# Patient Record
Sex: MALE | Race: WHITE | NOT HISPANIC OR LATINO | Employment: UNEMPLOYED | URBAN - NONMETROPOLITAN AREA
[De-identification: names, ages, dates, MRNs, and addresses within clinical notes are randomized per-mention and may not be internally consistent; named-entity substitution may affect disease eponyms.]

---

## 2018-11-05 ENCOUNTER — OFFICE VISIT (OUTPATIENT)
Dept: CARDIOLOGY | Facility: CLINIC | Age: 78
End: 2018-11-05

## 2018-11-05 VITALS
DIASTOLIC BLOOD PRESSURE: 60 MMHG | SYSTOLIC BLOOD PRESSURE: 100 MMHG | HEIGHT: 66 IN | HEART RATE: 60 BPM | BODY MASS INDEX: 26.36 KG/M2 | WEIGHT: 164 LBS

## 2018-11-05 DIAGNOSIS — E88.81 METABOLIC SYNDROME: ICD-10-CM

## 2018-11-05 DIAGNOSIS — R07.89 OTHER CHEST PAIN: ICD-10-CM

## 2018-11-05 DIAGNOSIS — I49.5 SSS (SICK SINUS SYNDROME) (HCC): ICD-10-CM

## 2018-11-05 DIAGNOSIS — Z79.4 TYPE 2 DIABETES MELLITUS WITHOUT COMPLICATION, WITH LONG-TERM CURRENT USE OF INSULIN (HCC): ICD-10-CM

## 2018-11-05 DIAGNOSIS — E03.8 OTHER SPECIFIED HYPOTHYROIDISM: ICD-10-CM

## 2018-11-05 DIAGNOSIS — R06.02 SHORTNESS OF BREATH: ICD-10-CM

## 2018-11-05 DIAGNOSIS — R01.1 MURMUR, CARDIAC: ICD-10-CM

## 2018-11-05 DIAGNOSIS — E11.9 TYPE 2 DIABETES MELLITUS WITHOUT COMPLICATION, WITH LONG-TERM CURRENT USE OF INSULIN (HCC): ICD-10-CM

## 2018-11-05 PROCEDURE — 93000 ELECTROCARDIOGRAM COMPLETE: CPT | Performed by: INTERNAL MEDICINE

## 2018-11-05 PROCEDURE — 99204 OFFICE O/P NEW MOD 45 MIN: CPT | Performed by: INTERNAL MEDICINE

## 2018-11-05 RX ORDER — AMIODARONE HYDROCHLORIDE 200 MG/1
200 TABLET ORAL DAILY
COMMUNITY
End: 2018-12-19 | Stop reason: SDUPTHER

## 2018-11-05 RX ORDER — SERTRALINE HYDROCHLORIDE 100 MG/1
100 TABLET, FILM COATED ORAL DAILY
COMMUNITY

## 2018-11-05 RX ORDER — ATORVASTATIN CALCIUM 40 MG/1
40 TABLET, FILM COATED ORAL NIGHTLY
COMMUNITY

## 2018-11-05 RX ORDER — GABAPENTIN 300 MG/1
300 CAPSULE ORAL 3 TIMES DAILY
COMMUNITY

## 2018-11-05 RX ORDER — CARVEDILOL 3.12 MG/1
3.12 TABLET ORAL 2 TIMES DAILY WITH MEALS
COMMUNITY

## 2018-11-05 RX ORDER — PANTOPRAZOLE SODIUM 40 MG/1
40 TABLET, DELAYED RELEASE ORAL DAILY
COMMUNITY

## 2018-11-05 RX ORDER — ASPIRIN 81 MG/1
81 TABLET ORAL DAILY
COMMUNITY

## 2018-11-05 RX ORDER — LISINOPRIL 2.5 MG/1
2.5 TABLET ORAL DAILY
COMMUNITY

## 2018-11-05 RX ORDER — LEVOTHYROXINE SODIUM 0.07 MG/1
75 TABLET ORAL DAILY
COMMUNITY

## 2018-11-05 RX ORDER — FERROUS SULFATE 325(65) MG
325 TABLET ORAL 2 TIMES DAILY
COMMUNITY

## 2018-11-05 RX ORDER — LOPERAMIDE HYDROCHLORIDE 2 MG/1
2 CAPSULE ORAL 2 TIMES DAILY
COMMUNITY

## 2018-11-05 NOTE — PROGRESS NOTES
Chief Complaint   Patient presents with   • Establish Care     establish care to check [employer] PPM.  per implant card, implanted 4/6/2016.  PPM has not been checked since per patient.   • Cardiac testing      8/15/2016 pericardial valve replacement.  Trying to get records from  and [facility].  Pt also reports carotid surgery around the same time at .  Possibly had heart stenting also, getting records.   • Labs     Labs attach to referral.  10/26/18 TSH 54.150.  Pt reports PCP adjusted levothyroxine.  He forgot to bring med list today.  Med list is per PCP referal notes.   • Chest Pain     CP radiates to left arm, last episode about 1-2 weeks ago.   • Shortness of Breath     SOA with exertion.   • Dizziness     dizziness and blurred vision.  No recent carotid u/s per patient        CARDIAC COMPLAINTS  chest pressure/discomfort, dyspnea and Dizziness      Subjective   Ko Florentino is a 78 y.o. male came in today with his son for the initial cardiac evaluation.  He has a very complex cardiac history.  He is not able to tell us much.  We have to pulled records from the hospital at [facility] as well as from .  He apparently had a stroke in 2015 and underwent right carotid endarterectomy.  Then in February 2016 he was noted to have significant aortic stenosis.  I'm not sure, whether he had paroxysmal atrial fibrillation at that time.  He then got admitted in April 2016 with bradycardia and found to have hyperkalemia and renal failure.  He had a temporary pacemaker placed, followed by permanent pacemaker placement.  Apparently he was sent to  few months later and underwent TAVAR.  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  He has not been followed by any cardiologist since then.  He was seen at your office recently and found to have significantly elevated TSH of 54.  Normal hemoglobin.  Elevated A1c of 9.5 and cholesterol of 143 with the LDL of 89.  He also was diagnosed  with hepatitis C and is scheduled to see a gastroenterologist.  Regarding his cardiac symptoms, he has been having chest pain in the form of pressure-like feeling in the middle of the chest radiating down to the left arm.  It started few weeks ago and is occurring more frequently now.  It lasts only for a few seconds and it is not associated with diaphoresis.  He also has been having symptoms of shortness of breath on minimal exertion.  He has been having episodes of dizziness and blurring of vision.  He said, his carotid has not been checked since he had the surgery. He quit smoking in 1964 but uses smokeless tobacco.  He does have family history of diabetes, hypertension and ischemic heart disease.  I he also did not bring any of his medication and medication list I have is from your notes.        Past Surgical History:   Procedure Laterality Date   • AORTIC VALVE REPAIR/REPLACEMENT  08/15/2016    TAVAR @ [facility]   • CARDIOVASCULAR STRESS TEST  04/21/2016    @[facility]. Dr. Benitez. XIMENA. Myoview- EF 62%. Negative.   • CAROTID ENDARTERECTOMY  08/06/2015    (R) Carotid Endarterectomy.. Dr. Purdy.   • ECHO - CONVERTED  02/22/2016    @[facility]. Dr. Magaña- EF 50%. AV G- 36/72. HUNG- 0.6. RVSP- 48 mmHg.   • PACEMAKER IMPLANTATION  04/06/2016    Dr. Magaña- [place].       Current Outpatient Prescriptions   Medication Sig Dispense Refill   • amiodarone (PACERONE) 200 MG tablet Take 200 mg by mouth Daily.     • aspirin 81 MG EC tablet Take 81 mg by mouth Daily.     • atorvastatin (LIPITOR) 40 MG tablet Take 40 mg by mouth Every Night.     • carvedilol (COREG) 3.125 MG tablet Take 3.125 mg by mouth 2 (Two) Times a Day With Meals.     • ferrous sulfate 325 (65 FE) MG tablet Take 325 mg by mouth 2 (Two) Times a Day.     • gabapentin (NEURONTIN) 300 MG capsule Take 300 mg by mouth 3 (Three) Times a Day.     • insulin detemir (LEVEMIR) 100 UNIT/ML injection Inject 30 Units under the skin into the appropriate area as directed 2 (Two)  Times a Day.     • levothyroxine (SYNTHROID, LEVOTHROID) 75 MCG tablet Take 75 mcg by mouth Daily.     • lisinopril (PRINIVIL,ZESTRIL) 2.5 MG tablet Take 2.5 mg by mouth Daily.     • loperamide (IMODIUM) 2 MG capsule Take 2 mg by mouth 2 (Two) Times a Day. As needed     • pantoprazole (PROTONIX) 40 MG EC tablet Take 40 mg by mouth Daily.     • sertraline (ZOLOFT) 100 MG tablet Take 100 mg by mouth Daily.       No current facility-administered medications for this visit.            ALLERGIES:  Patient has no known allergies.    Past Medical History:   Diagnosis Date   • Chronic kidney disease    • CVA (cerebral vascular accident) (CMS/HCC)    • Diabetes mellitus (CMS/HCC)    • Hepatitis C, chronic (CMS/HCC)    • Hypercholesterolemia    • Hypothyroidism        History   Smoking Status   • Former Smoker   • Quit date: 1964   Smokeless Tobacco   • Current User   • Types: Chew          Family History   Problem Relation Age of Onset   • Diabetes Mother    • Stroke Mother    • Emphysema Father    • Heart disease Daughter        Review of Systems   Constitution: Positive for weakness and malaise/fatigue. Negative for decreased appetite.   HENT: Negative for congestion and sore throat.    Eyes: Negative for blurred vision.   Cardiovascular: Positive for chest pain, dyspnea on exertion and palpitations.   Respiratory: Positive for shortness of breath. Negative for snoring.    Endocrine: Negative for cold intolerance and heat intolerance.   Hematologic/Lymphatic: Negative for adenopathy. Does not bruise/bleed easily.   Skin: Negative for itching, nail changes and skin cancer.   Musculoskeletal: Negative for arthritis and myalgias.   Gastrointestinal: Negative for abdominal pain, dysphagia and heartburn.   Genitourinary: Negative for bladder incontinence and frequency.   Neurological: Negative for dizziness, light-headedness, seizures and vertigo.   Psychiatric/Behavioral: Positive for depression. Negative for altered mental  "status.   Allergic/Immunologic: Negative for environmental allergies and hives.       Diabetes- Yes  Thyroid- abnormal    Objective     /60 (BP Location: Left arm)   Pulse 60   Ht 167.6 cm (66\")   Wt 74.4 kg (164 lb)   BMI 26.47 kg/m²     Physical Exam   Constitutional: He is oriented to person, place, and time. He appears well-developed and well-nourished.   HENT:   Head: Normocephalic.   Nose: Nose normal.   Eyes: Pupils are equal, round, and reactive to light. EOM are normal.   Neck: Normal range of motion. Neck supple.   Cardiovascular: Normal rate, regular rhythm, S1 normal and S2 normal.    Murmur heard.  Pulmonary/Chest: Effort normal and breath sounds normal.   Abdominal: Soft. Bowel sounds are normal.   Musculoskeletal: Normal range of motion. He exhibits no edema.   Neurological: He is alert and oriented to person, place, and time.   Skin: Skin is warm and dry.   Psychiatric: He has a normal mood and affect.         ECG 12 Lead  Date/Time: 11/5/2018 12:51 PM  Performed by: HILDA GEIGER  Authorized by: HILDA GEIGER   Previous ECG: no previous ECG available  Rhythm: paced  Rate: normal  QRS axis: left  Other findings: prolonged QTc interval  Clinical impression: abnormal ECG              Assessment/Plan   Patient's Body mass index is 26.47 kg/m². BMI is above normal parameters. Recommendations include: nutrition counseling.     Ko was seen today for establish care, cardiac testing, labs, chest pain, shortness of breath and dizziness.    Diagnoses and all orders for this visit:    Other chest pain  -     Stress Test With Myocardial Perfusion One Day; Future    Shortness of breath  -     Adult Transthoracic Echo Complete W/ Cont if Necessary Per Protocol; Future    Murmur, cardiac    SSS (sick sinus syndrome) (CMS/HCC)    Type 2 diabetes mellitus without complication, with long-term current use of insulin (CMS/HCC)    Other specified hypothyroidism    Metabolic syndrome    Other " orders  -     SCANNED - LABS  -     SCANNED - LABS     At baseline, his heart rate and blood pressure appears stable.  His EKG shows paced rhythm with prolonged QRS and QTC.  His clinical examination reveals systolic murmur at the mitral and aortic area.  I had a long talk with him and his son about his medications.  I'm not sure why he is on amiodarone.  It could be from PAF versus V. tach.  I scheduled his pacemaker to be interrogated.  If it is due to PAF, then he should be on an anticoagulant since his risk of stroke is going to be very high.  If there is no evidence of PAF, then he should go off the amiodarone since it can make the thyroid dysfunction was.  I also scheduled him to undergo an echocardiogram to evaluate his LV function, valvular structures and the PA pressure.  He also need a stress test in the form of Lexiscan to rule out ischemia causing the chest pain.  He sometimes has some questions about the hepatitis C about whether he needs to be checked also.  I advised him to talk you regarding.  He also need an elective carotid ultrasound done for follow-up after his surgery.  Based on the results of the interrogation and the results of the tests, further recommendations will be made.  His BMI is around 27.  I talked to him about cutting down on the carbohydrate intake.               Electronically signed by Woo Bob MD November 5, 2018 12:38 PM

## 2018-11-14 ENCOUNTER — OFFICE VISIT (OUTPATIENT)
Dept: CARDIOLOGY | Facility: CLINIC | Age: 78
End: 2018-11-14

## 2018-11-14 ENCOUNTER — HOSPITAL ENCOUNTER (OUTPATIENT)
Dept: CARDIOLOGY | Facility: HOSPITAL | Age: 78
Discharge: HOME | End: 2018-11-14
Attending: INTERNAL MEDICINE

## 2018-11-14 DIAGNOSIS — R07.89 OTHER CHEST PAIN: ICD-10-CM

## 2018-11-14 DIAGNOSIS — I49.5 SSS (SICK SINUS SYNDROME) (HCC): Primary | ICD-10-CM

## 2018-11-14 DIAGNOSIS — R06.02 SHORTNESS OF BREATH: ICD-10-CM

## 2018-11-14 PROCEDURE — 93288 INTERROG EVL PM/LDLS PM IP: CPT | Performed by: INTERNAL MEDICINE

## 2018-11-14 PROCEDURE — 93306 TTE W/DOPPLER COMPLETE: CPT | Performed by: INTERNAL MEDICINE

## 2018-11-14 PROCEDURE — 93017 CV STRESS TEST TRACING ONLY: CPT

## 2018-11-14 PROCEDURE — 0 TECHNETIUM SESTAMIBI: Performed by: INTERNAL MEDICINE

## 2018-11-14 PROCEDURE — 93018 CV STRESS TEST I&R ONLY: CPT | Performed by: INTERNAL MEDICINE

## 2018-11-14 PROCEDURE — 25010000002 REGADENOSON 0.4 MG/5ML SOLUTION: Performed by: INTERNAL MEDICINE

## 2018-11-14 PROCEDURE — 93306 TTE W/DOPPLER COMPLETE: CPT

## 2018-11-14 PROCEDURE — 78452 HT MUSCLE IMAGE SPECT MULT: CPT

## 2018-11-14 PROCEDURE — 78452 HT MUSCLE IMAGE SPECT MULT: CPT | Performed by: INTERNAL MEDICINE

## 2018-11-14 PROCEDURE — A9500 TC99M SESTAMIBI: HCPCS | Performed by: INTERNAL MEDICINE

## 2018-11-14 RX ADMIN — TECHNETIUM TC 99M SESTAMIBI 1 DOSE: 1 INJECTION INTRAVENOUS at 10:26

## 2018-11-14 RX ADMIN — REGADENOSON 0.4 MG: 0.08 INJECTION, SOLUTION INTRAVENOUS at 10:26

## 2018-11-14 RX ADMIN — TECHNETIUM TC 99M SESTAMIBI 1 DOSE: 1 INJECTION INTRAVENOUS at 10:27

## 2018-11-16 LAB
BH CV ECHO MEAS - ACS: 1.2 CM
BH CV ECHO MEAS - AO MEAN PG (FULL): 5.9 MMHG
BH CV ECHO MEAS - AO MEAN PG: 7.3 MMHG
BH CV ECHO MEAS - AO ROOT AREA (BSA CORRECTED): 1.4
BH CV ECHO MEAS - AO ROOT AREA: 5 CM^2
BH CV ECHO MEAS - AO ROOT DIAM: 2.5 CM
BH CV ECHO MEAS - AO V2 MEAN: 127.6 CM/SEC
BH CV ECHO MEAS - AO V2 VTI: 41.6 CM
BH CV ECHO MEAS - AVA(I,A): 1.7 CM^2
BH CV ECHO MEAS - AVA(I,D): 1.7 CM^2
BH CV ECHO MEAS - BSA(HAYCOCK): 1.9 M^2
BH CV ECHO MEAS - BSA: 1.8 M^2
BH CV ECHO MEAS - BZI_BMI: 26.5 KILOGRAMS/M^2
BH CV ECHO MEAS - BZI_METRIC_HEIGHT: 167.6 CM
BH CV ECHO MEAS - BZI_METRIC_WEIGHT: 74.4 KG
BH CV ECHO MEAS - EDV(CUBED): 69.6 ML
BH CV ECHO MEAS - EDV(MOD-SP4): 42 ML
BH CV ECHO MEAS - EDV(TEICH): 74.8 ML
BH CV ECHO MEAS - EF(CUBED): 80.8 %
BH CV ECHO MEAS - EF(MOD-SP4): 81 %
BH CV ECHO MEAS - EF(TEICH): 73.8 %
BH CV ECHO MEAS - ESV(CUBED): 13.4 ML
BH CV ECHO MEAS - ESV(MOD-SP4): 8 ML
BH CV ECHO MEAS - ESV(TEICH): 19.6 ML
BH CV ECHO MEAS - FS: 42.3 %
BH CV ECHO MEAS - IVS/LVPW: 0.99
BH CV ECHO MEAS - IVSD: 1.3 CM
BH CV ECHO MEAS - LA DIMENSION: 4.6 CM
BH CV ECHO MEAS - LA/AO: 1.8
BH CV ECHO MEAS - LV DIASTOLIC VOL/BSA (35-75): 22.8 ML/M^2
BH CV ECHO MEAS - LV IVRT: 0.14 SEC
BH CV ECHO MEAS - LV MASS(C)D: 195.7 GRAMS
BH CV ECHO MEAS - LV MASS(C)DI: 106.4 GRAMS/M^2
BH CV ECHO MEAS - LV MEAN PG: 1.4 MMHG
BH CV ECHO MEAS - LV SYSTOLIC VOL/BSA (12-30): 4.4 ML/M^2
BH CV ECHO MEAS - LV V1 MEAN: 55.8 CM/SEC
BH CV ECHO MEAS - LV V1 VTI: 22.1 CM
BH CV ECHO MEAS - LVIDD: 4.1 CM
BH CV ECHO MEAS - LVIDS: 2.4 CM
BH CV ECHO MEAS - LVLD AP4: 5.9 CM
BH CV ECHO MEAS - LVLS AP4: 4.8 CM
BH CV ECHO MEAS - LVOT AREA (M): 3.1 CM^2
BH CV ECHO MEAS - LVOT AREA: 3.1 CM^2
BH CV ECHO MEAS - LVOT DIAM: 2 CM
BH CV ECHO MEAS - LVPWD: 1.3 CM
BH CV ECHO MEAS - MV A MAX VEL: 97.2 CM/SEC
BH CV ECHO MEAS - MV DEC SLOPE: 334.6 CM/SEC^2
BH CV ECHO MEAS - MV E MAX VEL: 108.1 CM/SEC
BH CV ECHO MEAS - MV E/A: 1.1
BH CV ECHO MEAS - MV MEAN PG: 2.1 MMHG
BH CV ECHO MEAS - MV P1/2T MAX VEL: 117.6 CM/SEC
BH CV ECHO MEAS - MV P1/2T: 103 MSEC
BH CV ECHO MEAS - MV V2 MEAN: 66.4 CM/SEC
BH CV ECHO MEAS - MV V2 VTI: 39.6 CM
BH CV ECHO MEAS - MVA P1/2T LCG: 1.9 CM^2
BH CV ECHO MEAS - MVA(P1/2T): 2.1 CM^2
BH CV ECHO MEAS - MVA(VTI): 1.7 CM^2
BH CV ECHO MEAS - RAP SYSTOLE: 10 MMHG
BH CV ECHO MEAS - RVDD: 2.8 CM
BH CV ECHO MEAS - RVSP: 35.2 MMHG
BH CV ECHO MEAS - SI(AO): 112.6 ML/M^2
BH CV ECHO MEAS - SI(CUBED): 30.6 ML/M^2
BH CV ECHO MEAS - SI(LVOT): 37.3 ML/M^2
BH CV ECHO MEAS - SI(MOD-SP4): 18.5 ML/M^2
BH CV ECHO MEAS - SI(TEICH): 30 ML/M^2
BH CV ECHO MEAS - SV(AO): 207 ML
BH CV ECHO MEAS - SV(CUBED): 56.2 ML
BH CV ECHO MEAS - SV(LVOT): 68.7 ML
BH CV ECHO MEAS - SV(MOD-SP4): 34 ML
BH CV ECHO MEAS - SV(TEICH): 55.2 ML
BH CV ECHO MEAS - TR MAX VEL: 251.1 CM/SEC
BH CV STRESS COMMENTS STAGE 1: NORMAL
BH CV STRESS DOSE REGADENOSON STAGE 1: 0.4
BH CV STRESS DURATION MIN STAGE 1: 0
BH CV STRESS DURATION SEC STAGE 1: 10
BH CV STRESS PROTOCOL 1: NORMAL
BH CV STRESS RECOVERY BP: NORMAL MMHG
BH CV STRESS RECOVERY HR: 67 BPM
BH CV STRESS STAGE 1: 1
LV EF NUC BP: 74 %
MAXIMAL PREDICTED HEART RATE: 142 BPM
MAXIMAL PREDICTED HEART RATE: 142 BPM
PERCENT MAX PREDICTED HR: 47.89 %
STRESS BASELINE BP: NORMAL MMHG
STRESS BASELINE HR: 61 BPM
STRESS PERCENT HR: 56 %
STRESS POST PEAK BP: NORMAL MMHG
STRESS POST PEAK HR: 68 BPM
STRESS TARGET HR: 121 BPM
STRESS TARGET HR: 121 BPM

## 2018-12-19 ENCOUNTER — TELEPHONE (OUTPATIENT)
Dept: CARDIOLOGY | Facility: CLINIC | Age: 78
End: 2018-12-19

## 2018-12-19 RX ORDER — AMIODARONE HYDROCHLORIDE 200 MG/1
200 TABLET ORAL DAILY
Qty: 30 TABLET | Refills: 0 | Status: SHIPPED | OUTPATIENT
Start: 2018-12-19

## 2018-12-19 NOTE — TELEPHONE ENCOUNTER
[facility] ( [facility]) called asking for refills of Amiodarone 200 mg daily to last until he is seen on 2/5/19.

## 2018-12-19 NOTE — TELEPHONE ENCOUNTER
Script sent to [employer]. Unable to notify Christa at [facility] ( [phone]) or [employer] [phone].

## 2019-01-02 ENCOUNTER — OFFICE VISIT (OUTPATIENT)
Dept: PHARMACY | Facility: HOSPITAL | Age: 79
End: 2019-01-02

## 2019-01-02 ENCOUNTER — HOSPITAL ENCOUNTER (OUTPATIENT)
Dept: ULTRASOUND IMAGING | Facility: HOSPITAL | Age: 79
Discharge: HOME | End: 2019-01-02
Admitting: PHYSICIAN ASSISTANT

## 2019-01-02 VITALS
DIASTOLIC BLOOD PRESSURE: 70 MMHG | OXYGEN SATURATION: 96 % | SYSTOLIC BLOOD PRESSURE: 136 MMHG | BODY MASS INDEX: 26.36 KG/M2 | WEIGHT: 164 LBS | HEIGHT: 66 IN | HEART RATE: 62 BPM

## 2019-01-02 DIAGNOSIS — Z11.4 ENCOUNTER FOR SCREENING FOR HIV: ICD-10-CM

## 2019-01-02 DIAGNOSIS — F10.21 HISTORY OF ALCOHOLISM (HCC): ICD-10-CM

## 2019-01-02 DIAGNOSIS — Z51.81 ENCOUNTER FOR THERAPEUTIC DRUG LEVEL MONITORING: ICD-10-CM

## 2019-01-02 DIAGNOSIS — Z11.59 ENCOUNTER FOR SCREENING FOR OTHER VIRAL DISEASES: ICD-10-CM

## 2019-01-02 DIAGNOSIS — B18.2 CHRONIC HEPATITIS C WITHOUT HEPATIC COMA (HCC): Primary | ICD-10-CM

## 2019-01-02 DIAGNOSIS — R53.83 FATIGUE, UNSPECIFIED TYPE: ICD-10-CM

## 2019-01-02 DIAGNOSIS — B18.2 CHRONIC HEPATITIS C WITHOUT HEPATIC COMA (HCC): ICD-10-CM

## 2019-01-02 DIAGNOSIS — L81.8 TATTOOS: ICD-10-CM

## 2019-01-02 LAB
ALBUMIN SERPL-MCNC: 3.9 G/DL (ref 3.4–4.8)
ALBUMIN/GLOB SERPL: 1.1 G/DL (ref 1.5–2.5)
ALP SERPL-CCNC: 156 U/L (ref 40–129)
ALT SERPL W P-5'-P-CCNC: 74 U/L (ref 10–44)
ANION GAP SERPL CALCULATED.3IONS-SCNC: 8.6 MMOL/L (ref 3.6–11.2)
AST SERPL-CCNC: 82 U/L (ref 10–34)
BILIRUB SERPL-MCNC: 0.5 MG/DL (ref 0.2–1.8)
BUN BLD-MCNC: 25 MG/DL (ref 7–21)
BUN/CREAT SERPL: 13.7 (ref 7–25)
CALCIUM SPEC-SCNC: 10 MG/DL (ref 7.7–10)
CHLORIDE SERPL-SCNC: 100 MMOL/L (ref 99–112)
CO2 SERPL-SCNC: 24.4 MMOL/L (ref 24.3–31.9)
CREAT BLD-MCNC: 1.83 MG/DL (ref 0.43–1.29)
DEPRECATED RDW RBC AUTO: 45.9 FL (ref 37–54)
ERYTHROCYTE [DISTWIDTH] IN BLOOD BY AUTOMATED COUNT: 15.3 % (ref 11.5–14.5)
GFR SERPL CREATININE-BSD FRML MDRD: 36 ML/MIN/1.73
GLOBULIN UR ELPH-MCNC: 3.6 GM/DL
GLUCOSE BLD-MCNC: 233 MG/DL (ref 70–110)
HBV SURFACE AB SER RIA-ACNC: NORMAL
HCT VFR BLD AUTO: 36.3 % (ref 42–52)
HGB BLD-MCNC: 12 G/DL (ref 14–18)
HIV1+2 AB SER QL: NORMAL
INR PPP: 1.07 (ref 0.9–1.1)
MCH RBC QN AUTO: 27.3 PG (ref 27–33)
MCHC RBC AUTO-ENTMCNC: 33.1 G/DL (ref 33–37)
MCV RBC AUTO: 82.7 FL (ref 80–94)
OSMOLALITY SERPL CALC.SUM OF ELEC: 278.3 MOSM/KG (ref 273–305)
PLATELET # BLD AUTO: 113 10*3/MM3 (ref 130–400)
PMV BLD AUTO: 10.7 FL (ref 6–10)
POTASSIUM BLD-SCNC: 4.3 MMOL/L (ref 3.5–5.3)
PROT SERPL-MCNC: 7.5 G/DL (ref 6–8)
PROTHROMBIN TIME: 14.1 SECONDS (ref 11–15.4)
RBC # BLD AUTO: 4.39 10*6/MM3 (ref 4.7–6.1)
SODIUM BLD-SCNC: 133 MMOL/L (ref 135–153)
T4 FREE SERPL-MCNC: 0.66 NG/DL (ref 0.89–1.76)
TSH SERPL DL<=0.05 MIU/L-ACNC: >150 MIU/ML (ref 0.55–4.78)
WBC NRBC COR # BLD: 4.09 10*3/MM3 (ref 4.5–12.5)

## 2019-01-02 PROCEDURE — 82105 ALPHA-FETOPROTEIN SERUM: CPT

## 2019-01-02 PROCEDURE — 76705 ECHO EXAM OF ABDOMEN: CPT | Performed by: RADIOLOGY

## 2019-01-02 PROCEDURE — 84439 ASSAY OF FREE THYROXINE: CPT

## 2019-01-02 PROCEDURE — 80053 COMPREHEN METABOLIC PANEL: CPT

## 2019-01-02 PROCEDURE — 76705 ECHO EXAM OF ABDOMEN: CPT

## 2019-01-02 PROCEDURE — 81596 NFCT DS CHRNC HCV 6 ASSAYS: CPT

## 2019-01-02 PROCEDURE — 86708 HEPATITIS A ANTIBODY: CPT

## 2019-01-02 PROCEDURE — 84443 ASSAY THYROID STIM HORMONE: CPT

## 2019-01-02 PROCEDURE — 99204 OFFICE O/P NEW MOD 45 MIN: CPT | Performed by: PHYSICIAN ASSISTANT

## 2019-01-02 PROCEDURE — 85027 COMPLETE CBC AUTOMATED: CPT

## 2019-01-02 PROCEDURE — 80307 DRUG TEST PRSMV CHEM ANLYZR: CPT

## 2019-01-02 PROCEDURE — 85610 PROTHROMBIN TIME: CPT

## 2019-01-02 PROCEDURE — 87522 HEPATITIS C REVRS TRNSCRPJ: CPT

## 2019-01-02 PROCEDURE — 36415 COLL VENOUS BLD VENIPUNCTURE: CPT

## 2019-01-02 PROCEDURE — 86706 HEP B SURFACE ANTIBODY: CPT

## 2019-01-02 PROCEDURE — G0483 DRUG TEST DEF 22+ CLASSES: HCPCS

## 2019-01-02 PROCEDURE — G0432 EIA HIV-1/HIV-2 SCREEN: HCPCS

## 2019-01-02 NOTE — PROGRESS NOTES
: 1940    Chief Complaint   Patient presents with   • Hepatitis C       Ko Florentino is a 78 y.o. male who presents to the office today as a consultation from ARMANDO Quinonez for evaluation of Hepatitis C.    History of Present Illness:  He found out about having Hep C in 2018. He is not sure if he was ever checked with labs for Hep C in the past and is unsure about liver enzyme elevation. His son is his caregiver and most of the history comes from him. Patient does have shelter tattoos. Oral any history of IVDU or intranasal drug use. He drank daily in he past, admits that he used alcohol almost daily for 40-50 years, both beer and liquor. During that time period, he would have times that he would not drink at all. Does have history of elevated cholesterol and is an IDDM2. No known family history of liver disease. No personal history of known liver disease. Denies any current abdominal distension or peripheral edema. No recent abdominal imaging.     Labs 2018 pos Hep C Ab only.    Review of Systems   Constitutional: Positive for fatigue. Negative for chills and fever.   HENT: Negative for trouble swallowing.    Eyes: Negative.    Respiratory: Positive for shortness of breath. Negative for cough, choking and chest tightness.    Cardiovascular: Positive for chest pain.   Gastrointestinal: Negative for abdominal distention, abdominal pain, anal bleeding, blood in stool, constipation, diarrhea, nausea and vomiting.   Endocrine: Negative.    Genitourinary: Negative for difficulty urinating.   Musculoskeletal: Positive for back pain and neck pain.   Skin: Negative for pallor.   Allergic/Immunologic: Negative for environmental allergies and food allergies.   Neurological: Positive for dizziness, light-headedness and headaches.   Hematological: Bruises/bleeds easily.   Psychiatric/Behavioral: Negative.        Past Medical History:   Diagnosis Date   • Chronic kidney disease    • CVA (cerebral vascular  accident) (CMS/HCC)    • Diabetes mellitus (CMS/HCC)    • Hepatitis C, chronic (CMS/HCC)    • Hypercholesterolemia    • Hypothyroidism        Past Surgical History:   Procedure Laterality Date   • AORTIC VALVE REPAIR/REPLACEMENT  08/15/2016    TAVAR @    • CARDIOVASCULAR STRESS TEST  2016    @[facility]. Dr. Benitez. L. Myoview- EF 62%. Negative.   • CARDIOVASCULAR STRESS TEST  2018    L. Cardiolite- EF 74%. Diaphramatic attenuation. Negative   • CAROTID ENDARTERECTOMY  2015    (R) Carotid Endarterectomy.. Dr. Purdy.   • ECHO - CONVERTED  2016    @[facility]. Dr. Magaña- EF 50%. AV G- 36/72. HUNG- 0.6. RVSP- 48 mmHg.   • ECHO - CONVERTED  2018    EF > 70%. TAVAR. HUNG- 1.7 Cm2. MVA- 2.1 Cm2. Mild- Mod MR. RVSP- 35 mmHg.   • PACEMAKER IMPLANTATION  2016    Dr. Magaña- [place].       Family History   Problem Relation Age of Onset   • Diabetes Mother    • Stroke Mother    • Emphysema Father    • Heart disease Daughter        Social History     Socioeconomic History   • Marital status:      Spouse name: Not on file   • Number of children: Not on file   • Years of education: Not on file   • Highest education level: Not on file   Tobacco Use   • Smoking status: Former Smoker     Last attempt to quit: 1964     Years since quittin.0   • Smokeless tobacco: Current User     Types: Chew   Substance and Sexual Activity   • Alcohol use: No     Comment: quit drinking ETOH in .   • Drug use: No   • Sexual activity: Defer       Current Outpatient Medications:   •  amiodarone (PACERONE) 200 MG tablet, Take 1 tablet by mouth Daily., Disp: 30 tablet, Rfl: 0  •  aspirin 81 MG EC tablet, Take 81 mg by mouth Daily., Disp: , Rfl:   •  atorvastatin (LIPITOR) 40 MG tablet, Take 40 mg by mouth Every Night., Disp: , Rfl:   •  carvedilol (COREG) 3.125 MG tablet, Take 3.125 mg by mouth 2 (Two) Times a Day With Meals., Disp: , Rfl:   •  ferrous sulfate 325 (65 FE) MG tablet, Take 325 mg by mouth 2  "(Two) Times a Day., Disp: , Rfl:   •  gabapentin (NEURONTIN) 300 MG capsule, Take 300 mg by mouth 3 (Three) Times a Day., Disp: , Rfl:   •  insulin detemir (LEVEMIR) 100 UNIT/ML injection, Inject 30 Units under the skin into the appropriate area as directed 2 (Two) Times a Day., Disp: , Rfl:   •  levothyroxine (SYNTHROID, LEVOTHROID) 75 MCG tablet, Take 75 mcg by mouth Daily., Disp: , Rfl:   •  lisinopril (PRINIVIL,ZESTRIL) 2.5 MG tablet, Take 2.5 mg by mouth Daily., Disp: , Rfl:   •  loperamide (IMODIUM) 2 MG capsule, Take 2 mg by mouth 2 (Two) Times a Day. As needed, Disp: , Rfl:   •  pantoprazole (PROTONIX) 40 MG EC tablet, Take 40 mg by mouth Daily., Disp: , Rfl:   •  sertraline (ZOLOFT) 100 MG tablet, Take 100 mg by mouth Daily., Disp: , Rfl:     Allergies:   Patient has no known allergies.    Vitals:  /70 (BP Location: Left arm, Patient Position: Sitting, Cuff Size: Adult)   Pulse 62   Ht 167.6 cm (66\")   Wt 74.4 kg (164 lb)   SpO2 96%   BMI 26.47 kg/m²     Physical Exam   Constitutional: He is oriented to person, place, and time. He appears well-developed and well-nourished. No distress.   HENT:   Head: Normocephalic and atraumatic.   Nose: Nose normal.   Mouth/Throat: Oropharynx is clear and moist.   Eyes: Conjunctivae are normal. Right eye exhibits no discharge. Left eye exhibits no discharge. No scleral icterus.   Neck: Normal range of motion. No JVD present.   Cardiovascular: Normal rate, regular rhythm and normal heart sounds. Exam reveals no gallop and no friction rub.   No murmur heard.  Pulmonary/Chest: Effort normal and breath sounds normal. No respiratory distress. He has no wheezes. He has no rales. He exhibits no tenderness.   Abdominal: Soft. Bowel sounds are normal. He exhibits no mass. There is no tenderness.   Musculoskeletal: He exhibits no edema or deformity.   Mobility with cane   Neurological: He is alert and oriented to person, place, and time. Coordination normal.   Tardive " dyskinesia with repetitive tongue and mouth movements   Skin: Skin is warm and dry. No rash noted. He is not diaphoretic. No erythema.   Psychiatric: He has a normal mood and affect.   Vitals reviewed.      Assessment/Plan:  1. Chronic hepatitis C without hepatic coma (CMS/HCC)     2. History of alcoholism (CMS/HCC)    3. Tattoos    4. Encounter for screening for other viral diseases     5. Fatigue, unspecified type    6. Encounter for screening for HIV     7. Encounter for therapeutic drug level monitoring       Orders Placed This Encounter   Procedures   • US Liver   • AFP Tumor Marker   • CBC (No Diff)   • Comprehensive Metabolic Panel   • HCV FibroSURE   • HCV RNA By PCR, Qn Rfx Laurie   • Hepatitis A Antibody, Total   • Hepatitis B Surface Antibody   • HIV-1 / O / 2 Ag / Antibody 4th Generation   • Protime-INR   • T4, Free   • TSH   • Compliance Drug Analysis, Ur - Urine, Clean Catch   • Osmolality, Calculated     More recommendations will be made after these results have been reviewed. He will continue to abstain from alcohol and illegal drugs. He will have US liver to determine if any lesions or other liver diseases present. Immunity to Hep A and B will be determined and vaccinations recommended if needed. Due to his age and co-morbid health problems, after labs are reviewed, we will discuss if Hep C treatment is indicated.     We will request recent labs from PCP to review.         Follow up will be dependent on lab results.       Electronically signed 1/4/2019 12:20 PM  Fadia Aguilar PA-C, [facility]

## 2019-01-03 ENCOUNTER — TELEPHONE (OUTPATIENT)
Dept: GASTROENTEROLOGY | Facility: CLINIC | Age: 79
End: 2019-01-03

## 2019-01-03 LAB
AFP-TM SERPL-MCNC: 1.5 NG/ML (ref 0–8.3)
HAV AB SER QL IA: POSITIVE

## 2019-01-03 NOTE — TELEPHONE ENCOUNTER
Ashtyn spoke with PCP they are aware and have recently seen him in office, she faxed lab results again

## 2019-01-03 NOTE — TELEPHONE ENCOUNTER
Please inform patient and son that his thyroid levels are very abnormal. I have forwarded these electronically to his PCP. He is already on a thyroid medication but the dosage will likely need to be changes. Confirm with PCPs office that they received these abnormal labs. He should schedule appt with them ASAP.

## 2019-01-04 ENCOUNTER — TELEPHONE (OUTPATIENT)
Dept: GASTROENTEROLOGY | Facility: CLINIC | Age: 79
End: 2019-01-04

## 2019-01-04 LAB
A2 MACROGLOB SERPL-MCNC: 359 MG/DL (ref 110–276)
ALT SERPL W P-5'-P-CCNC: 78 IU/L (ref 0–55)
APO A-I SERPL-MCNC: 83 MG/DL (ref 101–178)
BILIRUB SERPL-MCNC: 0.5 MG/DL (ref 0–1.2)
FIBROSIS SCORING:: ABNORMAL
FIBROSIS STAGE SERPL QL: ABNORMAL
GGT SERPL-CCNC: 56 IU/L (ref 0–65)
HAPTOGLOB SERPL-MCNC: 73 MG/DL (ref 34–200)
HCV AB SER QL: ABNORMAL
HCV GENTYP SERPL NAA+PROBE: NORMAL
HCV RNA SERPL NAA+PROBE-ACNC: NORMAL IU/ML
HCV RNA SERPL NAA+PROBE-LOG IU: NORMAL LOG10 IU/ML
LABORATORY COMMENT REPORT: ABNORMAL
LIMITATIONS:: ABNORMAL
LIVER FIBR SCORE SERPL CALC.FIBROSURE: 0.89 (ref 0–0.21)
NECROINFLAMM ACTIVITY SCORING:: ABNORMAL
NECROINFLAMMATORY ACT GRADE SERPL QL: ABNORMAL
NECROINFLAMMATORY ACT SCORE SERPL: 0.68 (ref 0–0.17)
REF LAB TEST REF RANGE: NORMAL

## 2019-01-05 LAB — CONV REPORT SUMMARY: NORMAL

## 2019-04-17 ENCOUNTER — OFFICE VISIT (OUTPATIENT)
Dept: PHARMACY | Facility: HOSPITAL | Age: 79
End: 2019-04-17

## 2019-04-17 VITALS
OXYGEN SATURATION: 99 % | HEART RATE: 60 BPM | SYSTOLIC BLOOD PRESSURE: 155 MMHG | BODY MASS INDEX: 25.71 KG/M2 | WEIGHT: 160 LBS | HEIGHT: 66 IN | DIASTOLIC BLOOD PRESSURE: 69 MMHG

## 2019-04-17 DIAGNOSIS — K74.60 CIRRHOSIS OF LIVER WITHOUT ASCITES, UNSPECIFIED HEPATIC CIRRHOSIS TYPE (HCC): Primary | ICD-10-CM

## 2019-04-17 DIAGNOSIS — Z86.19 HISTORY OF HEPATITIS C: ICD-10-CM

## 2019-04-17 PROCEDURE — 99214 OFFICE O/P EST MOD 30 MIN: CPT | Performed by: PHYSICIAN ASSISTANT
